# Patient Record
Sex: MALE | Race: ASIAN | URBAN - METROPOLITAN AREA
[De-identification: names, ages, dates, MRNs, and addresses within clinical notes are randomized per-mention and may not be internally consistent; named-entity substitution may affect disease eponyms.]

---

## 2018-09-21 ENCOUNTER — RADIANT APPOINTMENT (OUTPATIENT)
Dept: GENERAL RADIOLOGY | Facility: CLINIC | Age: 43
End: 2018-09-21
Attending: FAMILY MEDICINE
Payer: COMMERCIAL

## 2018-09-21 ENCOUNTER — OFFICE VISIT (OUTPATIENT)
Dept: URGENT CARE | Facility: URGENT CARE | Age: 43
End: 2018-09-21
Payer: COMMERCIAL

## 2018-09-21 ENCOUNTER — TELEPHONE (OUTPATIENT)
Dept: URGENT CARE | Facility: URGENT CARE | Age: 43
End: 2018-09-21

## 2018-09-21 VITALS
DIASTOLIC BLOOD PRESSURE: 66 MMHG | WEIGHT: 151.6 LBS | OXYGEN SATURATION: 99 % | HEART RATE: 69 BPM | SYSTOLIC BLOOD PRESSURE: 104 MMHG | TEMPERATURE: 98.3 F

## 2018-09-21 DIAGNOSIS — M25.511 ACUTE PAIN OF RIGHT SHOULDER: ICD-10-CM

## 2018-09-21 DIAGNOSIS — R10.13 DYSPEPSIA: ICD-10-CM

## 2018-09-21 DIAGNOSIS — R07.89 CHEST DISCOMFORT: ICD-10-CM

## 2018-09-21 DIAGNOSIS — A04.8 H. PYLORI INFECTION: Primary | ICD-10-CM

## 2018-09-21 DIAGNOSIS — M75.51 SUBACROMIAL BURSITIS OF RIGHT SHOULDER JOINT: Primary | ICD-10-CM

## 2018-09-21 LAB
ALBUMIN SERPL-MCNC: 3.6 G/DL (ref 3.4–5)
ALP SERPL-CCNC: 49 U/L (ref 40–150)
ALT SERPL W P-5'-P-CCNC: 20 U/L (ref 0–70)
AMYLASE SERPL-CCNC: 98 U/L (ref 30–110)
ANION GAP SERPL CALCULATED.3IONS-SCNC: 1 MMOL/L (ref 3–14)
AST SERPL W P-5'-P-CCNC: 22 U/L (ref 0–45)
BILIRUB SERPL-MCNC: 1.2 MG/DL (ref 0.2–1.3)
BUN SERPL-MCNC: 7 MG/DL (ref 7–30)
CALCIUM SERPL-MCNC: 9.5 MG/DL (ref 8.5–10.1)
CHLORIDE SERPL-SCNC: 109 MMOL/L (ref 94–109)
CO2 SERPL-SCNC: 30 MMOL/L (ref 20–32)
CREAT SERPL-MCNC: 0.8 MG/DL (ref 0.66–1.25)
ERYTHROCYTE [DISTWIDTH] IN BLOOD BY AUTOMATED COUNT: 12.3 % (ref 10–15)
GFR SERPL CREATININE-BSD FRML MDRD: >90 ML/MIN/1.7M2
GLUCOSE SERPL-MCNC: 104 MG/DL (ref 70–99)
HCT VFR BLD AUTO: 44.5 % (ref 40–53)
HGB BLD-MCNC: 14.8 G/DL (ref 13.3–17.7)
LIPASE SERPL-CCNC: 181 U/L (ref 73–393)
MCH RBC QN AUTO: 28.9 PG (ref 26.5–33)
MCHC RBC AUTO-ENTMCNC: 33.3 G/DL (ref 31.5–36.5)
MCV RBC AUTO: 87 FL (ref 78–100)
PLATELET # BLD AUTO: 255 10E9/L (ref 150–450)
POTASSIUM SERPL-SCNC: 4.8 MMOL/L (ref 3.4–5.3)
PROT SERPL-MCNC: 7.2 G/DL (ref 6.8–8.8)
RBC # BLD AUTO: 5.12 10E12/L (ref 4.4–5.9)
SODIUM SERPL-SCNC: 140 MMOL/L (ref 133–144)
WBC # BLD AUTO: 6.9 10E9/L (ref 4–11)

## 2018-09-21 PROCEDURE — 80053 COMPREHEN METABOLIC PANEL: CPT | Performed by: FAMILY MEDICINE

## 2018-09-21 PROCEDURE — 93000 ELECTROCARDIOGRAM COMPLETE: CPT | Performed by: FAMILY MEDICINE

## 2018-09-21 PROCEDURE — 99204 OFFICE O/P NEW MOD 45 MIN: CPT | Performed by: FAMILY MEDICINE

## 2018-09-21 PROCEDURE — 87338 HPYLORI STOOL AG IA: CPT | Performed by: FAMILY MEDICINE

## 2018-09-21 PROCEDURE — 36415 COLL VENOUS BLD VENIPUNCTURE: CPT | Performed by: FAMILY MEDICINE

## 2018-09-21 PROCEDURE — 82150 ASSAY OF AMYLASE: CPT | Performed by: FAMILY MEDICINE

## 2018-09-21 PROCEDURE — 85027 COMPLETE CBC AUTOMATED: CPT | Performed by: FAMILY MEDICINE

## 2018-09-21 PROCEDURE — 83690 ASSAY OF LIPASE: CPT | Performed by: FAMILY MEDICINE

## 2018-09-21 PROCEDURE — 73030 X-RAY EXAM OF SHOULDER: CPT | Mod: RT

## 2018-09-21 NOTE — MR AVS SNAPSHOT
After Visit Summary   9/21/2018    Adama Bearden    MRN: [id number]           Patient Information     Date Of Birth          1975        Visit Information        Provider Department      9/21/2018 10:30 AM Brock Mo MD [facility]        Today's Diagnoses     Subacromial bursitis of right shoulder joint    -  1    Acute pain of right shoulder        Dyspepsia        Chest discomfort          Care Instructions      Follow up with a primary care provider for further evaluation of the abdominal and chest discomfort and to establish care for your diabetes mellitus and for hypothyroidism.     Increase the dose of the Zantac (Ranitidine) to 150 mg by mouth twice a day.      Understanding Shoulder Impingement Syndrome    Shoulder impingement syndrome is a problem with the shoulder joint. It occurs when certain parts within the joint swell and are pinched. This can cause nagging pain and problems with moving the arm.  What causes shoulder impingement syndrome?  It is possible to develop impingement after years of normal shoulder use. But in most cases the condition occurs because of repeated overhead movements. These include such things as stocking shelves, painting, swimming, and throwing. These movements can irritate parts of the shoulder, leading to swelling. Swollen parts of the shoulder take up more room, making the joint space smaller. Some parts that may be involved include:    A sac of fluid (bursa) that cushions the shoulder joint.  When the bursa is irritated, it may lead to a condition called bursitis. This is when the bursa swells with fluid, filling and squeezing the joint space.    Fibrous tissues (tendons) that connect muscle to bone. When tendons are irritated, they may become swollen. This is called tendonitis.    The end (acromion) of the shoulder blade. This bone may be flat or hooked. If the acromion is hooked, the joint space may be smaller than normal.  Growths (bone spurs) on the acromion can also narrow the joint space. Acromion problems don t often cause impingement. But they can make it worse.  Symptoms of shoulder impingement syndrome  Symptoms include pain, pinching, or stiffness in your shoulder. Pain often comes with movement, particularly reaching overhead or backward. It may also be felt when the shoulder is at rest. Pain at night during sleep is common.  Treatment for shoulder impingement syndrome  Treatment will depend on the cause of the problem, how bad the problem is, and if other parts of the shoulder are damaged. Treatment may include the following:    Active rest. This allows the shoulder to heal. It means using the arm and shoulder, but avoiding activities that cause pain. These likely include reaching overhead or sleeping on your shoulder.    Cold packs. These help reduce swelling and relieve pain.    Prescription or over-the-counter pain medicines. These help relieve pain and swelling.    Arm and shoulder exercises. These help keep your shoulder joint mobile as it heals. They also help improve muscle strength around the joint.    Shots of medicine into the joint. This can help reduce swelling and pain for a short time.  If other measures don t work to relieve symptoms, you may need surgery. This opens up space in the joint to allow pain-free motion.  Possible complications of shoulder impingement syndrome  It might be tempting to stop using your shoulder completely to avoid pain. But doing so may lead to a condition called frozen shoulder. To help prevent this, follow instructions you are given for active rest and for doing exercises to help your shoulder heal.  When to call your healthcare provider  Call your healthcare provider right away if you have any of these:    Fever of 100.4 F (38 C) or higher, or as directed    Symptoms that don t get better, or get worse    New symptoms   Date Last Reviewed: 3/10/2016    3636-8845 [employer]. [address] [place] [address], [place] [address]. All rights reserved. This information is not intended as a substitute for professional medical care. Always follow your healthcare professional's instructions.        Bursitis  You have bursitis. This is an inflammation of the bursa. These are small, fluid-filled sacs that surround the larger joints of the body. The bursa help the muscles and tendons move smoothly over the joints.  Bursitis often happens in the shoulder. But it can also affect the elbows, hips, pelvis, knees, toes, and heels. Bursitis can be caused by injury, overuse of the joint, or infection of the bursa. Symptoms include pain and tenderness over a joint. Symptoms get worse with movement.  Bursitis is treated with an anti-inflammatory medicine and by resting the joint. More severe cases require injection of medicine directly into the bursa.    Home care    Rest the painful joint and protect it from movement. This will allow the inflammation to heal faster.    Apply an ice pack over the injured area for no more than 15 to 20 minutes. Do this every 3 to 6 hours for the first 24 to 48 hours. Keep using ice packs 3 to 4 times a day until the pain and swelling improves.     To make an ice pack, put ice cubes in a sealed plastic zip-lock bag. Wrap the bag in a clean, thin towel or cloth. Never put ice or an ice pack directly on the skin. As the ice melts, be careful to avoid getting any wrap or splint wet.    You may take over-the-counter pain medicine to treat pain and inflammation, unless another medicine was prescribed. Anti-inflammatory pain medicines may be more effective. Talk with your provider beforeusing these medicines if you have chronic liver or kidney disease, or ever had a stomach ulcer or GI (gastrointestinal) bleeding.    As your symptoms improve, slowly begin to move the joint. Do not overuse the joint. This may cause the symptoms to flare up again.  When to seek medical  advice  Call your healthcare provider right away if any of these occur:    Redness over the painful area    Increasing pain or swelling at the joint    Fever of 100.4 F (38 C) or above lasting for 24 to 48 hours  Date Last Reviewed: 11/21/2015 2000-2017 [employer]. [address] [place] [address]. All rights reserved. This information is not intended as a substitute for professional medical care. Always follow your healthcare professional's instructions.        Shoulder Exercises: Biceps Curl    This exercise stretches and strengthens your shoulders and arms. Before starting, read through all the instructions. During the exercise, breathe normally and use smooth movements. Stop if you feel any pain. If pain persists, call your healthcare provider.  Here are the steps for the biceps curl:     Hold a ____ pound weight in each hand, with your palms facing your body. Tuck your arms close to your sides. Ask your physical therapist how much weight to use.     Bend your left elbow and raise the weight to your left shoulder. As you lower that weight, bend your right elbow and raise the weight to your right shoulder. Continue to alternate arms.    Repeat ____ times. Do ____ sets ____ times a day.     CAUTION: Keep your arms close to your body throughout the exercise. Keep your wrists straight.   Date Last Reviewed: 11/1/2017 2000-2017 [employer]. [address] [place] [address]. All rights reserved. This information is not intended as a substitute for professional medical care. Always follow your healthcare professional's instructions.        Shoulder Exercises: Shoulder Press    This exercise stretches and strengthens your shoulders. Before starting, read through all the instructions. During the exercise, breathe normally and use smooth movements. Stop if you feel any pain. If pain persists, call your healthcare provider.    Hold a ____ pound weight in each hand,  elbows at shoulder level, palms facing forward. Arms to the side and slightly forward. Ask your physical therapist how much weight to use.     Raise one arm up until it s almost straight. Hold for a second. Lower the weight, extending the other arm up.    Repeat ____ times with each arm. Do ____ sets ____ times a day.  CAUTION: If you have shoulder problems, consult your healthcare provider before doing this exercise. Keep your head and body still during the exercise. Only your arms should move.   Date Last Reviewed: 11/1/2017 2000-2017 [employer]. [address] [place] [address]. All rights reserved. This information is not intended as a substitute for professional medical care. Always follow your healthcare professional's instructions.        Shoulder Exercises: Side Raise    This exercise stretches and strengthens your shoulders. Before starting, read through all the instructions. During the exercise, breathe normally and use smooth movements. Stop if you feel any pain. If pain persists, call your healthcare provider.    Stand straight, holding a ____ pound weight in each hand, arms at sides, feet shoulder-width apart. Ask your physical therapist or healthcare provider how much weight to use.     Slowly extend your arms up and out until weights are at shoulder level. Slowly return to starting position.    Repeat ____ times. Do ____ sets ____ times a day.     CAUTION: Don t swing the weights or raise weights above shoulder level.   Date Last Reviewed: 11/1/2017 2000-2017 [employer]. [address] [place] [address]. All rights reserved. This information is not intended as a substitute for professional medical care. Always follow your healthcare professional's instructions.        Shoulder Exercises: Triceps Press    This exercise stretches and strengthens your shoulders. Before starting, read through all the instructions. During the exercise, breathe normally  and use smooth movements. Stop if you feel any pain. If pain persists, call your healthcare provider.    Grasp a ___ pound  weight in each hand. Raise one arm overhead. Hold that arm close to your ear. Bend your elbow and lower the weight behind your head, as far as you can, being careful not to hit your head. Ask your physical therapist or healthcare provider how much weight to use.     Slowly straighten your elbow, extending your arm upward. Return to starting position.    Repeat ____ times with each arm. Do ____ sets ____ times a day.  CAUTION: Keep your head still and neck straight. Keep your arm close to your ear. Don t arch your back.   Date Last Reviewed: 11/1/2017 2000-2017 [employer]. [address] [place] [address]. All rights reserved. This information is not intended as a substitute for professional medical care. Always follow your healthcare professional's instructions.        Shoulder Exercises    To start, sit in a chair with your feet flat on the floor. Your weight should be slightly forward so that you re balanced evenly on your buttocks. Relax your shoulders and keep your head level. Avoid arching your back or rounding your shoulders. Using a chair with arms may help you keep your balance.    Raise your arms, elbows bent, to shoulder height.    Slowly move your forearms together. Hold for 5 seconds.    Return to starting position. Repeat 5 times.  Date Last Reviewed: 10/1/2015    9990-0702 [employer]. [address] [place] [address]. All rights reserved. This information is not intended as a substitute for professional medical care. Always follow your healthcare professional's instructions.                Follow-ups after your visit        Additional Services     ORTHOPEDICS ADULT REFERRAL       Your provider has referred you to: PREFERRED PROVIDERS:  [facility]  "Clinic  [phone]   [email or web address]  OTHER PROVIDERS:  [facility] [phone]   [email or web address]    Please be aware that coverage of these services is subject to the terms and limitations of your health insurance plan.  Call member services at your health plan with any benefit or coverage questions.      Please bring the following to your appointment:    >>   Any x-rays, CTs or MRIs which have been performed.  Contact the facility where they were done to arrange for  prior to your scheduled appointment.    >>   List of current medications   >>   This referral request   >>   Any documents/labs given to you for this referral                  Future tests that were ordered for you today     Open Future Orders        Priority Expected Expires Ordered    H Pylori antigen, stool Routine  10/21/2018 9/21/2018            Who to contact     If you have questions or need follow up information about today's clinic visit or your schedule please contact [facility] directly at [phone].  Normal or non-critical lab and imaging results will be communicated to you by MyChart, letter or phone within 4 business days after the clinic has received the results. If you do not hear from us within 7 days, please contact the clinic through [employer] or phone. If you have a critical or abnormal lab result, we will notify you by phone as soon as possible.  Submit refill requests through [employer] or call your pharmacy and they will forward the refill request to us. Please allow 3 business days for your refill to be completed.          Additional Information About Your Visit        [employer] Information     [employer] lets you send messages to your doctor, view your test results, renew your prescriptions, schedule appointments and more. To sign up, go to [email or web address] . Click on \"Log in\" on the left side of " "the screen, which will take you to the Welcome page. Then click on \"Sign up Now\" on the right side of the page.     You will be asked to enter the access code listed below, as well as some personal information. Please follow the directions to create your username and password.     Your access code is: [email or web address]  Expires: 2018 12:38 PM     Your access code will  in 90 days. If you need help or a new code, please call your [facility] clinic or [phone].        Care EveryWhere ID     This is your Care EveryWhere ID. This could be used by other organizations to access your [facility] medical records  [id number]        Your Vitals Were     Pulse Temperature Pulse Oximetry             69 98.3  F (36.8  C) (Tympanic) 99%          Blood Pressure from Last 3 Encounters:   18 104/66    Weight from Last 3 Encounters:   18 151 lb 9.6 oz (68.8 kg)              We Performed the Following     Amylase     CBC with platelets     Comprehensive metabolic panel     EKG 12-lead complete w/read - Clinics     [facility]     ORTHOPEDICS ADULT REFERRAL        Primary Care Provider Fax #    Physician No Ref-Primary [phone]       No address on file        Equal Access to Services     JAMIL GORE : Hadii alice Bright, waaxda francis, qaybta kaalmada ademarta, elicia lloyd. So [facility] [phone].    ATENCIÓN: Si habla español, tiene a keys disposición servicios gratuitos de asistencia lingüística. Jarrell al [phone].    We comply with applicable federal civil rights laws and [place] laws. We do not discriminate on the basis of race, color, national origin, age, disability, sex, sexual orientation, or gender identity.            Thank you!     Thank you for choosing [facility]  for your care. Our goal is always to provide you with excellent care. Hearing back from our patients is one way we can continue to improve our services. Please take a few minutes to " complete the written survey that you may receive in the mail after your visit with us. Thank you!             Your Updated Medication List - Protect others around you: Learn how to safely use, store and throw away your medicines at www.disposemymeds.org.          This list is accurate as of 9/21/18 12:38 PM.  Always use your most recent med list.                   Brand Name Dispense Instructions for use Diagnosis    LEVOTHYROXINE SODIUM PO      Take 75 mcg by mouth daily        METFORMIN HCL PO      Take 250 mg by mouth daily

## 2018-09-21 NOTE — PATIENT INSTRUCTIONS
Follow up with a primary care provider for further evaluation of the abdominal and chest discomfort and to establish care for your diabetes mellitus and for hypothyroidism.     Increase the dose of the Zantac (Ranitidine) to 150 mg by mouth twice a day.      follow up with an orthopedic specialist regarding the right shoulder    Place ice onto the right shoulder.     Understanding Shoulder Impingement Syndrome    Shoulder impingement syndrome is a problem with the shoulder joint. It occurs when certain parts within the joint swell and are pinched. This can cause nagging pain and problems with moving the arm.  What causes shoulder impingement syndrome?  It is possible to develop impingement after years of normal shoulder use. But in most cases the condition occurs because of repeated overhead movements. These include such things as stocking shelves, painting, swimming, and throwing. These movements can irritate parts of the shoulder, leading to swelling. Swollen parts of the shoulder take up more room, making the joint space smaller. Some parts that may be involved include:    A sac of fluid (bursa) that cushions the shoulder joint.  When the bursa is irritated, it may lead to a condition called bursitis. This is when the bursa swells with fluid, filling and squeezing the joint space.    Fibrous tissues (tendons) that connect muscle to bone. When tendons are irritated, they may become swollen. This is called tendonitis.    The end (acromion) of the shoulder blade. This bone may be flat or hooked. If the acromion is hooked, the joint space may be smaller than normal. Growths (bone spurs) on the acromion can also narrow the joint space. Acromion problems don t often cause impingement. But they can make it worse.  Symptoms of shoulder impingement syndrome  Symptoms include pain, pinching, or stiffness in your shoulder. Pain often comes with movement, particularly reaching overhead or backward. It may also be felt when  the shoulder is at rest. Pain at night during sleep is common.  Treatment for shoulder impingement syndrome  Treatment will depend on the cause of the problem, how bad the problem is, and if other parts of the shoulder are damaged. Treatment may include the following:    Active rest. This allows the shoulder to heal. It means using the arm and shoulder, but avoiding activities that cause pain. These likely include reaching overhead or sleeping on your shoulder.    Cold packs. These help reduce swelling and relieve pain.    Prescription or over-the-counter pain medicines. These help relieve pain and swelling.    Arm and shoulder exercises. These help keep your shoulder joint mobile as it heals. They also help improve muscle strength around the joint.    Shots of medicine into the joint. This can help reduce swelling and pain for a short time.  If other measures don t work to relieve symptoms, you may need surgery. This opens up space in the joint to allow pain-free motion.  Possible complications of shoulder impingement syndrome  It might be tempting to stop using your shoulder completely to avoid pain. But doing so may lead to a condition called frozen shoulder. To help prevent this, follow instructions you are given for active rest and for doing exercises to help your shoulder heal.  When to call your healthcare provider  Call your healthcare provider right away if you have any of these:    Fever of 100.4 F (38 C) or higher, or as directed    Symptoms that don t get better, or get worse    New symptoms   Date Last Reviewed: 3/10/2016    9770-8576 [employer]. [address] [place] [address], [place] [address]. All rights reserved. This information is not intended as a substitute for professional medical care. Always follow your healthcare professional's instructions.        Bursitis  You have bursitis. This is an inflammation of the bursa. These are small, fluid-filled sacs that surround the larger joints of  the body. The bursa help the muscles and tendons move smoothly over the joints.  Bursitis often happens in the shoulder. But it can also affect the elbows, hips, pelvis, knees, toes, and heels. Bursitis can be caused by injury, overuse of the joint, or infection of the bursa. Symptoms include pain and tenderness over a joint. Symptoms get worse with movement.  Bursitis is treated with an anti-inflammatory medicine and by resting the joint. More severe cases require injection of medicine directly into the bursa.    Home care    Rest the painful joint and protect it from movement. This will allow the inflammation to heal faster.    Apply an ice pack over the injured area for no more than 15 to 20 minutes. Do this every 3 to 6 hours for the first 24 to 48 hours. Keep using ice packs 3 to 4 times a day until the pain and swelling improves.     To make an ice pack, put ice cubes in a sealed plastic zip-lock bag. Wrap the bag in a clean, thin towel or cloth. Never put ice or an ice pack directly on the skin. As the ice melts, be careful to avoid getting any wrap or splint wet.    You may take over-the-counter pain medicine to treat pain and inflammation, unless another medicine was prescribed. Anti-inflammatory pain medicines may be more effective. Talk with your provider beforeusing these medicines if you have chronic liver or kidney disease, or ever had a stomach ulcer or GI (gastrointestinal) bleeding.    As your symptoms improve, slowly begin to move the joint. Do not overuse the joint. This may cause the symptoms to flare up again.  When to seek medical advice  Call your healthcare provider right away if any of these occur:    Redness over the painful area    Increasing pain or swelling at the joint    Fever of 100.4 F (38 C) or above lasting for 24 to 48 hours  Date Last Reviewed: 11/21/2015 2000-2017 [employer]. [address] [place] [address], [place] [address]. All rights reserved. This information is  not intended as a substitute for professional medical care. Always follow your healthcare professional's instructions.        Shoulder Exercises: Biceps Curl    This exercise stretches and strengthens your shoulders and arms. Before starting, read through all the instructions. During the exercise, breathe normally and use smooth movements. Stop if you feel any pain. If pain persists, call your healthcare provider.  Here are the steps for the biceps curl:     Hold a ____ pound weight in each hand, with your palms facing your body. Tuck your arms close to your sides. Ask your physical therapist how much weight to use.     Bend your left elbow and raise the weight to your left shoulder. As you lower that weight, bend your right elbow and raise the weight to your right shoulder. Continue to alternate arms.    Repeat ____ times. Do ____ sets ____ times a day.     CAUTION: Keep your arms close to your body throughout the exercise. Keep your wrists straight.   Date Last Reviewed: 11/1/2017 2000-2017 [employer]. [address] [place] [address]. All rights reserved. This information is not intended as a substitute for professional medical care. Always follow your healthcare professional's instructions.        Shoulder Exercises: Shoulder Press    This exercise stretches and strengthens your shoulders. Before starting, read through all the instructions. During the exercise, breathe normally and use smooth movements. Stop if you feel any pain. If pain persists, call your healthcare provider.    Hold a ____ pound weight in each hand, elbows at shoulder level, palms facing forward. Arms to the side and slightly forward. Ask your physical therapist how much weight to use.     Raise one arm up until it s almost straight. Hold for a second. Lower the weight, extending the other arm up.    Repeat ____ times with each arm. Do ____ sets ____ times a day.  CAUTION: If you have shoulder problems, consult your  healthcare provider before doing this exercise. Keep your head and body still during the exercise. Only your arms should move.   Date Last Reviewed: 11/1/2017 2000-2017 [employer]. [address] [place] [address]. All rights reserved. This information is not intended as a substitute for professional medical care. Always follow your healthcare professional's instructions.        Shoulder Exercises: Side Raise    This exercise stretches and strengthens your shoulders. Before starting, read through all the instructions. During the exercise, breathe normally and use smooth movements. Stop if you feel any pain. If pain persists, call your healthcare provider.    Stand straight, holding a ____ pound weight in each hand, arms at sides, feet shoulder-width apart. Ask your physical therapist or healthcare provider how much weight to use.     Slowly extend your arms up and out until weights are at shoulder level. Slowly return to starting position.    Repeat ____ times. Do ____ sets ____ times a day.     CAUTION: Don t swing the weights or raise weights above shoulder level.   Date Last Reviewed: 11/1/2017 2000-2017 [employer]. [address] [place] [address]. All rights reserved. This information is not intended as a substitute for professional medical care. Always follow your healthcare professional's instructions.        Shoulder Exercises: Triceps Press    This exercise stretches and strengthens your shoulders. Before starting, read through all the instructions. During the exercise, breathe normally and use smooth movements. Stop if you feel any pain. If pain persists, call your healthcare provider.    Grasp a ___ pound  weight in each hand. Raise one arm overhead. Hold that arm close to your ear. Bend your elbow and lower the weight behind your head, as far as you can, being careful not to hit your head. Ask your physical therapist or healthcare provider how much  weight to use.     Slowly straighten your elbow, extending your arm upward. Return to starting position.    Repeat ____ times with each arm. Do ____ sets ____ times a day.  CAUTION: Keep your head still and neck straight. Keep your arm close to your ear. Don t arch your back.   Date Last Reviewed: 11/1/2017 2000-2017 [employer]. [address] [place] [address]. All rights reserved. This information is not intended as a substitute for professional medical care. Always follow your healthcare professional's instructions.        Shoulder Exercises    To start, sit in a chair with your feet flat on the floor. Your weight should be slightly forward so that you re balanced evenly on your buttocks. Relax your shoulders and keep your head level. Avoid arching your back or rounding your shoulders. Using a chair with arms may help you keep your balance.    Raise your arms, elbows bent, to shoulder height.    Slowly move your forearms together. Hold for 5 seconds.    Return to starting position. Repeat 5 times.  Date Last Reviewed: 10/1/2015    6002-8105 [employer]. [address] [place] [address]. All rights reserved. This information is not intended as a substitute for professional medical care. Always follow your healthcare professional's instructions.

## 2018-09-21 NOTE — PROGRESS NOTES
"SUBJECTIVE:  Chief Complaint   Patient presents with     Shoulder Pain     right side x 2 days radiating pain down to wrist, also past history of neck pain right side     Heartburn     3 days ago, Zantac this morning, not much help per pt      Adama Bearden is a 43 year old right-handed male smoker with Diabetes Mellitus Type 2 who presents with two complaints:    Complaint #1: right right shoulder pain (at the tip of the shoulder and at the deltoid area) with radiation down the triceps.  However, the fingers of the right hand feel weak since this morning.    Symptoms began two days ago and worse since this morning.    Context:  Injury: No.  Pain exacerbated by abduction of the right shoulder.   Relieved by none. .  He treated it initially with pain relief balm without much relief.  . This is the first time this type of injury has occurred to this patient.     Patient had right-sided posterior neck pain one week ago.  No obvious injury.  The pain went away 3-4 days ago.     Complaint #2:  \"Heartburn\"for the past four days.  The discomfort (gassy sensation at the entire abdomen and at the bilateral chest) occurs about 1/2 to 1 hour after eating.  This discomfort lasts about one hour  .  Zantac this morning has not helped relieve the symptoms. No current left shoulder problems.    No sweats.  No shortness of breath.   Patient is concerned that this discomfort is due to his heart.    Patient denies frequent Ibuprofen/Aspirin use.    Patient drinks occasionally.    No blood in the stools.  Normal bowel movements once or twice daily.  No unexplained weight loss.  No pain with swallowing.  No troubles swallowing solids and liquids.  No family history of Gastrointestinal cancer.      Past medical history:    Diabetes Mellitus Type 2  Hypothyroidism  Smoker patient has been smoking for 5 years (one cigarette per day)      Current Outpatient Prescriptions   Medication Sig Dispense Refill     UNABLE TO FIND 250 mg " daily MEDICATION NAME: Glyciphage       UNABLE TO FIND Take 75 mg by mouth daily MEDICATION NAME: Thyronorm       Social History   Substance Use Topics     Smoking status: Current Some Day Smoker     Smokeless tobacco: Never Used     Alcohol use Not on file     Patient has lived in the [place] for the past year.     Family History:    Mother has Diabetes Mellitus (Insulin-dependent)  Mother  of Leukemia      ROS:  CONSTITUTIONAL: negative for weight loss/fevers/chills.  .    INTEGUMENTARY/SKIN: NEGATIVE for worrisome rashes, moles or lesions  EYES: NEGATIVE for vision changes or irritation  ENT/MOUTH: NEGATIVE for ear, mouth and throat problems  RESP:NEGATIVE for significant cough or SOB  CV: POSITIVE for bilateral chest discomfort.    GI: POSITIVE for gassy sensation in the entire abdomen.    : negative for dysuria, hematuria, decreased urinary stream,   MUSCULOSKELETAL: POSITIVE  for right shoulder pain.    NEURO: POSITIVE for a sensation of weakness in the right hand.   HEME/ALLERGY/IMMUNE: NEGATIVE for bleeding problems  PSYCHIATRIC: NEGATIVE for changes in mood or affect    EXAM:   /66 (BP Location: Right arm, Patient Position: Sitting, Cuff Size: Adult Regular)  Pulse 69  Temp 98.3  F (36.8  C) (Tympanic)  Wt 151 lb 9.6 oz (68.8 kg)  SpO2 99%  M/S Exam:Right shoulder has no edema/ecchymosis/hematomas.  No masses.  There is pain with palpation over the left trapezius muscle and over the subacromial space and over the deltoid region of the right shoulder.  There is full ROM at the right shoulder; however, there is pain with abduction.  Impingement signs are positive.    GENERAL APPEARANCE: healthy, alert and no distress  EYES:  No scleral icterus.   HEART:  regular rate and rhythm. Normal S1 S2 without murmurs/rubs/gallops  LUNGS:  clear to auscultation in all lung fields.    BACK:  No costovertebral angle pain with percussion.   EXTREMITIES: peripheral pulses normal  SKIN: no suspicious  lesions or rashes  NEURO: Normal strength and tone along the entire right arm and right hand and at the fingers of the right hand.  , sensory exam grossly normal at the fingers of the right hand.  , mentation intact and speech normal  ABDOMEN:  Soft.  Normal bowel sounds.  There is pain with palpation over the epigastrium.  No masses.  No rebound/guarding.  No hepatosplenomegaly.       LABS:    Results for orders placed or performed in visit on 09/21/18   CBC with platelets   Result Value Ref Range    WBC 6.9 4.0 - 11.0 10e9/L    RBC Count 5.12 4.4 - 5.9 10e12/L    Hemoglobin 14.8 13.3 - 17.7 g/dL    Hematocrit 44.5 40.0 - 53.0 %    MCV 87 78 - 100 fl    MCH 28.9 26.5 - 33.0 pg    MCHC 33.3 31.5 - 36.5 g/dL    RDW 12.3 10.0 - 15.0 %    Platelet Count 255 150 - 450 10e9/L   Comprehensive metabolic panel   Result Value Ref Range    Sodium 140 133 - 144 mmol/L    Potassium 4.8 3.4 - 5.3 mmol/L    Chloride 109 94 - 109 mmol/L    Carbon Dioxide 30 20 - 32 mmol/L    Anion Gap 1 (L) 3 - 14 mmol/L    Glucose 104 (H) 70 - 99 mg/dL    Urea Nitrogen 7 7 - 30 mg/dL    Creatinine 0.80 0.66 - 1.25 mg/dL    GFR Estimate >90 >60 mL/min/1.7m2    GFR Estimate If Black >90 >60 mL/min/1.7m2    Calcium 9.5 8.5 - 10.1 mg/dL    Bilirubin Total 1.2 0.2 - 1.3 mg/dL    Albumin 3.6 3.4 - 5.0 g/dL    Protein Total 7.2 6.8 - 8.8 g/dL    Alkaline Phosphatase 49 40 - 150 U/L    ALT 20 0 - 70 U/L    AST 22 0 - 45 U/L         X-RAY was done.    X-rays of the right shoulder are within normal limits     EKG:  Normal sinus rate and rhythm.  Rate is 60 beats per minute.  Normal axis.  Normal intervals.  No bundle branch blocks.  No Q waves.  No ST-T abnormalities.     ASSESSMENT:  Acute Right shoulder pain  Acute Subacromial Bursitis of the right shoulder     Dyspepsia    Chest discomfort. The worsening of the chest discomfort and the normal EKG makes cardiac causes less likely    PLAN:  For the Right shoulder pain and Right Subacromial  Bursitis:  Ice  Shoulder exercises  follow up with orthopedic specialist.     For the Dyspepsia:  Increase the dose of Zantac to 150 mg PO BID.  follow up with a primary care provider for further evaluation.  Pending labs:  H pylori stool antigen.  Amylase, Lipase. Patient will be contacted if these lab results are abnormal.     Brock Mo MD

## 2018-09-22 NOTE — TELEPHONE ENCOUNTER
SUBJECTIVE:  The patient's September 21, 2018, Lipase and Amylase tests were normal.      PLAN:  Please notify patient of these normal results.  According to these lab results, pancreatitis is not the cause of the patient's symptoms.      The H Pylori test is still pending.     Brock Mo MD

## 2018-09-24 LAB
H PYLORI AG STL QL IA: ABNORMAL
SPECIMEN SOURCE: ABNORMAL

## 2018-09-25 NOTE — TELEPHONE ENCOUNTER
Called patient, left message to return phone call. Please advised Dr. Fong and Dr. Larsen message below.  Praneeth Valderrama, Medical Assistant

## 2018-09-25 NOTE — TELEPHONE ENCOUNTER
H pylori test positive.    Prevpac can be called to the pharmacy of the patient's choice.  Orders entered in Epic.  Pt needs to follow up with primary care provider after finishing the Prevpac.

## 2018-09-26 NOTE — TELEPHONE ENCOUNTER
Left message for patient to call back to the clinic.   Charisma Chanel CMA (AAMA) 9/26/2018 9:46 AM

## 2018-09-26 NOTE — TELEPHONE ENCOUNTER
Patient returned phone call. Patient is aware of lab results and prevacid medication. Instructed patient to follow up with PCP after finishing rx.   Praneeth Valderrama Medical Assistant

## 2018-09-26 NOTE — TELEPHONE ENCOUNTER
Called -ve pharmacy, Prevpac will not be available until tomorrow.     Called patient, and left message that Prev pac will not be available until tomorrow. If he wants to change pharmacy to call back.     Praneeth Valderrama, Medical Assistant

## 2018-11-30 ENCOUNTER — OFFICE VISIT (OUTPATIENT)
Dept: URGENT CARE | Facility: URGENT CARE | Age: 43
End: 2018-11-30
Payer: COMMERCIAL

## 2018-11-30 VITALS
HEART RATE: 81 BPM | SYSTOLIC BLOOD PRESSURE: 109 MMHG | OXYGEN SATURATION: 100 % | TEMPERATURE: 98.8 F | DIASTOLIC BLOOD PRESSURE: 80 MMHG

## 2018-11-30 DIAGNOSIS — A04.8 H. PYLORI INFECTION: ICD-10-CM

## 2018-11-30 DIAGNOSIS — R10.9 STOMACH DISCOMFORT: Primary | ICD-10-CM

## 2018-11-30 DIAGNOSIS — R07.89 CHEST DISCOMFORT: ICD-10-CM

## 2018-11-30 PROCEDURE — 99214 OFFICE O/P EST MOD 30 MIN: CPT | Performed by: FAMILY MEDICINE

## 2018-11-30 PROCEDURE — 87338 HPYLORI STOOL AG IA: CPT | Performed by: FAMILY MEDICINE

## 2018-11-30 PROCEDURE — 93000 ELECTROCARDIOGRAM COMPLETE: CPT | Performed by: FAMILY MEDICINE

## 2018-11-30 NOTE — PATIENT INSTRUCTIONS
We will call you with the results of the stool sample    Take zantac/ranitidine 150mg twice a day (before breakfast and before dinner)  For increased stomach discomfort try tums/calcium carbonate      Call if symptoms are worsening or very bothersome

## 2018-11-30 NOTE — MR AVS SNAPSHOT
After Visit Summary   11/30/2018    Adama Bearden    MRN: [id number]           Patient Information     Date Of Birth          1975        Visit Information        Provider Department      11/30/2018 1:35 PM Kulwinder Morales MD [facility]        Today's Diagnoses     Stomach discomfort    -  1    Treated for H. pylori infection        Chest discomfort          Care Instructions    We will call you with the results of the stool sample    Take zantac/ranitidine 150mg twice a day (before breakfast and before dinner)  For increased stomach discomfort try tums/calcium carbonate      Call if symptoms are worsening or very bothersome          Follow-ups after your visit        Your next 10 appointments already scheduled     Dec 03, 2018  9:00 AM CST   PHYSICAL with Nadeem Nye [facility]-C   [facility]    [address] [place] [id number] [phone]              Future tests that were ordered for you today     Open Future Orders        Priority Expected Expires Ordered    H Pylori antigen, stool Routine  12/30/2018 11/30/2018            Who to contact     If you have questions or need follow up information about today's clinic visit or your schedule please contact [facility] directly at [phone].  Normal or non-critical lab and imaging results will be communicated to you by MyChart, letter or phone within 4 business days after the clinic has received the results. If you do not hear from us within 7 days, please contact the clinic through MyChart or phone. If you have a critical or abnormal lab result, we will notify you by phone as soon as possible.  Submit refill requests through [employer] or call your pharmacy and they will forward the refill request to us. Please allow 3 business days for your refill to be completed.          Additional Information About Your Visit        [facility]  "Information     [employer] lets you send messages to your doctor, view your test results, renew your prescriptions, schedule appointments and more. To sign up, go to [email or web address] . Click on \"Log in\" on the left side of the screen, which will take you to the Welcome page. Then click on \"Sign up Now\" on the right side of the page.     You will be asked to enter the access code listed below, as well as some personal information. Please follow the directions to create your username and password.     Your access code is: [email or web address]  Expires: 2018 11:38 AM     Your access code will  in 90 days. If you need help or a new code, please call your [facility] clinic or [phone].        Care EveryWhere ID     This is your Care EveryWhere ID. This could be used by other organizations to access your [facility] medical records  [id number]        Your Vitals Were     Pulse Temperature Pulse Oximetry             81 98.8  F (37.1  C) (Oral) 100%          Blood Pressure from Last 3 Encounters:   18 109/80   18 104/66    Weight from Last 3 Encounters:   18 151 lb 9.6 oz (68.8 kg)              We Performed the Following     EKG 12-lead complete w/read - Clinics        Primary Care Provider Fax #    Physician No Ref-Primary [phone]       No address on file        Equal Access to Services     CINTIA GORE : Hadii aad ku hadasho Soomaali, waaxda luqadaha, qaybta kaalmada adeegyada, elicia renee . So [facility] [phone].    ATENCIÓN: Si habla español, tiene a keys disposición servicios gratuitos de asistencia lingüística. Llame al [phone].    We comply with applicable federal civil rights laws and [place] laws. We do not discriminate on the basis of race, color, national origin, age, disability, sex, sexual orientation, or gender identity.            Thank you!     Thank you for choosing [facility]  for your care. Our goal is always to provide you " with excellent care. Hearing back from our patients is one way we can continue to improve our services. Please take a few minutes to complete the written survey that you may receive in the mail after your visit with us. Thank you!             Your Updated Medication List - Protect others around you: Learn how to safely use, store and throw away your medicines at www.disposemymeds.org.          This list is accurate as of 11/30/18  2:23 PM.  Always use your most recent med list.                   Brand Name Dispense Instructions for use Diagnosis    [id number] miscellaneous box    PREVPAC    1 each    Follow package instructions    H. pylori infection       LEVOTHYROXINE SODIUM PO      Take 75 mcg by mouth daily        METFORMIN HCL PO      Take 250 mg by mouth daily

## 2018-11-30 NOTE — PROGRESS NOTES
Subjective:   Adama Bearden is a 43 year old male who presents for   Chief Complaint   Patient presents with     Urgent Care     Chest Pain     Cx tightness, discomfort and Lt hand numbness started yesterday. Dry Cough x1 week. Had been experiencing lightheadedness this morning. Abdominal bloating and feels like throat is burning on/off x1 week, had Hx of stomach acid. Denies Cx pain only discomfort     H pylori positive earlier this year on 9/21/18 and was treated. Has not been screened for eradication.   Felt like symptoms had improved for a period but symptoms may been bothersome again. Denies pain in left shoulder also has chronic muscle/ joint discomfort in the shoulder joint. Slight lightheadedness.      3 days ago started taking 75mg twice a day not helping a lot.     Hx of reflux - reports abdominal bloating for a week.     FH: no individuals with hyperlipidemia.     There are no active problems to display for this patient.      Current Outpatient Prescriptions   Medication     LEVOTHYROXINE SODIUM PO     METFORMIN HCL PO     [id number] MISC     No current facility-administered medications for this visit.      ROS:  As above per HPI    Objective:   /80 (BP Location: Right arm, Patient Position: Chair, Cuff Size: Adult Regular)  Pulse 81  Temp 98.8  F (37.1  C) (Oral)  SpO2 100%, There is no height or weight on file to calculate BMI.  Gen:  NAD, well-nourished, sitting in chair comfortably  HEENT: EOMI, sclera anicteric, Head normocephalic, ; nares patent; moist mucous membranes  Neck: trachea midline, no thyromegaly  CV:  Hemodynamically stable, RRR  Pulm:  no increased work of breathing , CTAB, no wheezes/rales/rhonchi   ABD: soft, non-distended, + BS in all quadrants  Extrem: no cyanosis, edema or clubbing  Skin: no obvious rashes or abnormalities  Psych: Euthymic, linear thoughts, normal rate of speech    EKG: regular with rate 66 , QTc 364, RSR in v1 and v2, no  consecutive T wave inversions, no q waves in II/III/AVF    Assessment & Plan:   Adama Bearden, 43 year old male who presents with:    Stomach discomfort  Recently Treated for H. pylori infection  Will screen stool today to see if medication was effective to eradicate H. Pylori suspect with ongoing symptoms this may not be the case. Patient showed improvement with GI cocktail as listed below. REcommended for the time being to double the dose of zantac to a total of 150mg bid and take tums as needed. Patient anxious and wanted EKG done today so this was honored, no obvious abnormalities seen today as has no left sided arm pain or substernal chest discomfort. Stable vital signs.   - H Pylori antigen, stool    15mL of mylanta and 15mL of Maalox given here for his stomach discomfort/bloating.     Kulwinder Morales MD   [facility]     Options for treatment and/or follow-up care were reviewed with the patient. Adama Bearden and/or legal guardian was engaged and actively involved in the decision making process. Patient/guardian verbalized understanding of the options discussed and was satisfied with the final plan.

## 2018-12-03 LAB
H PYLORI AG STL QL IA: NORMAL
SPECIMEN SOURCE: NORMAL

## 2018-12-07 ENCOUNTER — OFFICE VISIT (OUTPATIENT)
Dept: PEDIATRICS | Facility: CLINIC | Age: 43
End: 2018-12-07
Payer: COMMERCIAL

## 2018-12-07 VITALS
HEART RATE: 90 BPM | DIASTOLIC BLOOD PRESSURE: 58 MMHG | WEIGHT: 152 LBS | TEMPERATURE: 98 F | HEIGHT: 66 IN | BODY MASS INDEX: 24.43 KG/M2 | OXYGEN SATURATION: 98 % | SYSTOLIC BLOOD PRESSURE: 92 MMHG

## 2018-12-07 DIAGNOSIS — K21.9 GASTROESOPHAGEAL REFLUX DISEASE WITHOUT ESOPHAGITIS: ICD-10-CM

## 2018-12-07 DIAGNOSIS — E03.8 OTHER SPECIFIED HYPOTHYROIDISM: ICD-10-CM

## 2018-12-07 DIAGNOSIS — Z23 NEED FOR PROPHYLACTIC VACCINATION AND INOCULATION AGAINST INFLUENZA: ICD-10-CM

## 2018-12-07 DIAGNOSIS — Z11.4 SCREENING FOR HUMAN IMMUNODEFICIENCY VIRUS: ICD-10-CM

## 2018-12-07 DIAGNOSIS — Z86.19 HISTORY OF HELICOBACTER PYLORI INFECTION: ICD-10-CM

## 2018-12-07 DIAGNOSIS — E11.9 TYPE 2 DIABETES MELLITUS WITHOUT COMPLICATION, WITHOUT LONG-TERM CURRENT USE OF INSULIN (H): ICD-10-CM

## 2018-12-07 DIAGNOSIS — Z13.220 SCREENING CHOLESTEROL LEVEL: ICD-10-CM

## 2018-12-07 DIAGNOSIS — E78.5 HYPERLIPIDEMIA LDL GOAL <70: ICD-10-CM

## 2018-12-07 DIAGNOSIS — Z00.00 ROUTINE GENERAL MEDICAL EXAMINATION AT A HEALTH CARE FACILITY: Primary | ICD-10-CM

## 2018-12-07 PROCEDURE — 90686 IIV4 VACC NO PRSV 0.5 ML IM: CPT | Performed by: INTERNAL MEDICINE

## 2018-12-07 PROCEDURE — 99396 PREV VISIT EST AGE 40-64: CPT | Mod: 25 | Performed by: INTERNAL MEDICINE

## 2018-12-07 PROCEDURE — 90471 IMMUNIZATION ADMIN: CPT | Performed by: INTERNAL MEDICINE

## 2018-12-07 PROCEDURE — 99213 OFFICE O/P EST LOW 20 MIN: CPT | Mod: 25 | Performed by: INTERNAL MEDICINE

## 2018-12-07 PROCEDURE — 99207 C FOOT EXAM  NO CHARGE: CPT | Mod: 25 | Performed by: INTERNAL MEDICINE

## 2018-12-07 ASSESSMENT — ENCOUNTER SYMPTOMS
HEMATURIA: 0
NAUSEA: 0
ABDOMINAL PAIN: 0
EYE PAIN: 0
DIARRHEA: 0
COUGH: 0
CONSTIPATION: 0
DYSURIA: 0
DIZZINESS: 1
FEVER: 0
PARESTHESIAS: 0
HEADACHES: 0
SHORTNESS OF BREATH: 0
HEARTBURN: 1
ARTHRALGIAS: 1
WEAKNESS: 0
NERVOUS/ANXIOUS: 0
HEMATOCHEZIA: 0
CHILLS: 0
FREQUENCY: 0
JOINT SWELLING: 0
PALPITATIONS: 0
MYALGIAS: 1

## 2018-12-07 NOTE — PROGRESS NOTES

## 2018-12-07 NOTE — MR AVS SNAPSHOT
After Visit Summary   12/7/2018    Adama Bearden    MRN: [id number]           Patient Information     Date Of Birth          1975        Visit Information        Provider Department      12/7/2018 9:10 AM Paramjit Lazaro MD [facility]        Today's Diagnoses     Routine general medical examination at a health care facility    -  1    Type 2 diabetes mellitus without complication, without long-term current use of insulin (H)        Hypothyroidism        Gastroesophageal reflux disease without esophagitis        History of Helicobacter pylori infection        Screening cholesterol level        Screening for human immunodeficiency virus          Care Instructions    Nice to meet you today!    Labs next week - come fasting (nothing to eat/drink besides water for 8 hours). I'll send you the results on [employer].     Keep taking your diabetes medication and thyroid medication - I'll let you know if the dose needs to change.     Okay to try zantac 150 mg every day for the next month. Can go up to twice a day if needed. We'll touch base in 1 month to see how things are going. Avoiding spicy food, alcohol, coffee - watch to see if certain foods make it worse.     I'll see you back in a month for diabetes and to check on the reflux. You can always come back sooner if the joints start to bother you again.     I do recommend seeing a dentist.    Preventive Health Recommendations  Male Ages 40 to 49    Yearly exam:             See your health care provider every year in order to  o   Review health changes.   o   Discuss preventive care.    o   Review your medicines if your doctor has prescribed any.    You should be tested each year for STDs (sexually transmitted diseases) if you re at risk.     Have a cholesterol test every 5 years.     Have a colonoscopy (test for colon cancer) if someone in your family has had colon cancer or polyps before age 50.     After age 45, have a  diabetes test (fasting glucose). If you are at risk for diabetes, you should have this test every 3 years.      Talk with your health care provider about whether or not a prostate cancer screening test (PSA) is right for you.    Shots: Get a flu shot each year. Get a tetanus shot every 10 years.     Nutrition:    Eat at least 5 servings of fruits and vegetables daily.     Eat whole-grain bread, whole-wheat pasta and brown rice instead of white grains and rice.     Get adequate Calcium and Vitamin D.     Lifestyle    Exercise for at least 150 minutes a week (30 minutes a day, 5 days a week). This will help you control your weight and prevent disease.     Limit alcohol to one drink per day.     No smoking.     Wear sunscreen to prevent skin cancer.     See your dentist every six months for an exam and cleaning.              Follow-ups after your visit        Follow-up notes from your care team     Return in about 4 weeks (around 1/4/2019) for Follow Up.      Your next 10 appointments already scheduled     Dec 13, 2018  7:50 AM CST   LAB with [facility]   [facility]    [address]  [address]  [place] [facility] [address] [phone]           Please do not eat 10-12 hours before your appointment if you are coming in fasting for labs on lipids, cholesterol, or glucose (sugar). This does not apply to pregnant women. Water, hot tea and black coffee (with nothing added) are okay. Do not drink other fluids, diet soda or chew gum.            Jan 04, 2019  8:50 AM CST   Office Visit with Paramjit Lazaro MD   [facility] ([facility])    [address]  [address]  [place] [facility] [address] [phone]           Bring a current list of meds and any records pertaining to this visit. For Physicals, please bring immunization records and any forms needing to be filled out. Please arrive 10 minutes early to complete paperwork.               Future tests that were ordered for you today     Open Future Orders        Priority Expected Expires Ordered    Lipid panel reflex to direct LDL Fasting Routine  12/7/2019 12/7/2018    Albumin Random Urine Quantitative with Creat Ratio Routine  12/7/2019 12/7/2018    HIV Antigen Antibody Combo Routine  12/7/2019 12/7/2018    Comprehensive metabolic panel Routine  12/7/2019 12/7/2018    Hemoglobin A1c Routine  12/7/2019 12/7/2018    TSH with free T4 reflex Routine  12/7/2019 12/7/2018            Who to contact     If you have questions or need follow up information about today's clinic visit or your schedule please contact [facility] directly at [phone].  Normal or non-critical lab and imaging results will be communicated to you by [employer], letter or phone within 4 business days after the clinic has received the results. If you do not hear from us within 7 days, please contact the clinic through [facility] or phone. If you have a critical or abnormal lab result, we will notify you by phone as soon as possible.  Submit refill requests through [employer] or call your pharmacy and they will forward the refill request to us. Please allow 3 business days for your refill to be completed.          Additional Information About Your Visit        [employer] Information     [employer] gives you secure access to your electronic health record. If you see a primary care provider, you can also send messages to your care team and make appointments. If you have questions, please call your primary care clinic.  If you do not have a primary care provider, please call [phone] and they will assist you.        Care EveryWhere ID     This is your Care EveryWhere ID. This could be used by other organizations to access your [facility] medical records  [id number]        Your Vitals Were     Pulse Temperature Pulse Oximetry             90 98  F (36.7  C) (Oral) 98%          Blood Pressure from Last 3 Encounters:   12/07/18 92/58    11/30/18 109/80   09/21/18 104/66    Weight from Last 3 Encounters:   12/07/18 152 lb (68.9 kg)   09/21/18 151 lb 9.6 oz (68.8 kg)              We Performed the Following     FOOT EXAM        Primary Care Provider Fax #    Physician No Ref-Primary [phone]       No address on file        Equal Access to Services     [facility]: Hadii aad ku hadasho Soomaali, waaxda luqadaha, qaybta kaalmada adeegyada, elicia haywoodin hayaan adeeg shravanjimmydebi renee . So [facility] [phone].    ATENCIÓN: Si habla español, tiene a keys disposición servicios gratuitos de asistencia lingüística. Llame al [phone].    We comply with applicable federal civil rights laws and [place] laws. We do not discriminate on the basis of race, color, national origin, age, disability, sex, sexual orientation, or gender identity.            Thank you!     Thank you for choosing [facility]  for your care. Our goal is always to provide you with excellent care. Hearing back from our patients is one way we can continue to improve our services. Please take a few minutes to complete the written survey that you may receive in the mail after your visit with us. Thank you!             Your Updated Medication List - Protect others around you: Learn how to safely use, store and throw away your medicines at www.disposemymeds.org.          This list is accurate as of 12/7/18 10:09 AM.  Always use your most recent med list.                   Brand Name Dispense Instructions for use Diagnosis    LEVOTHYROXINE SODIUM PO      Take 75 mcg by mouth daily        METFORMIN HCL PO      Take by mouth daily

## 2018-12-07 NOTE — PROGRESS NOTES
SUBJECTIVE:   CC: Adama Bearden is an 43 year old male who presents for preventative health visit.     Physical   Annual:     Getting at least 3 servings of Calcium per day:  Yes    Bi-annual eye exam:  NO    Dental care twice a year:  NO    Sleep apnea or symptoms of sleep apnea:  None    Diet:  Diabetic    Frequency of exercise:  1 day/week    Duration of exercise:  Less than 15 minutes    Taking medications regularly:  Yes    Medication side effects:  None    Additional concerns today:  No    PHQ-2 Total Score: 0    CONCERNS: Heartburn-past positive H. Pylori, Pain in Right shoulder and Left Elbow      # history of h pylori  - was treated, recently test was negative  - taking zantac when he feels bad... Took it 4 days continuously and did feel better  - taking tums, after lunch and dinner  - reduced eating spicy food   - wants to know if he can take zantac regularly    # DM  - diagnosed 6-7 years go  - takes metformin (glyciphage) from [facility], 250 mg daily  - last blood tests 1-1.5 years ago  - no pain/tingling in feet.  - has not seen an eye doctor    # Hypothyroidism  - diagnosed 3-4 years ao  - thyronorm 25 mcg    # Joint pain  - happened a few days ago, now better  - okay with not going into detail about this today, can rtc if needed    Today's PHQ-2 Score:   PHQ-2 ( 1999 Pfizer) 12/7/2018   Q1: Little interest or pleasure in doing things 0   Q2: Feeling down, depressed or hopeless 0   PHQ-2 Score 0   Q1: Little interest or pleasure in doing things Not at all   Q2: Feeling down, depressed or hopeless Not at all   PHQ-2 Score 0     Abuse: Current or Past(Physical, Sexual or Emotional)- No  Do you feel safe in your environment? Yes    Social History   Substance Use Topics     Smoking status: Current Some Day Smoker     Smokeless tobacco: Never Used     Alcohol use Yes      Comment: occ     Alcohol Use 12/7/2018   If you drink alcohol do you typically have greater than 3 drinks per day OR greater than 7  drinks per week? No     Last PSA: No results found for: PSA    Reviewed orders with patient. Reviewed health maintenance and updated orders accordingly - Yes  Patient Active Problem List   Diagnosis     History of Helicobacter pylori infection     Type 2 diabetes mellitus without complication, without long-term current use of insulin (H)     Hypothyroidism     Gastroesophageal reflux disease without esophagitis     Past Surgical History:   Procedure Laterality Date     NO HISTORY OF SURGERY         Social History   Substance Use Topics     Smoking status: Current Some Day Smoker     Smokeless tobacco: Never Used     Alcohol use Yes      Comment: occ     Family History   Problem Relation Age of Onset     Diabetes Mother      Cancer Mother      Leukemia,  at age 55     Cerebrovascular Disease Father      Colon Cancer No family hx of      Prostate Cancer No family hx of          Current Outpatient Prescriptions   Medication Sig Dispense Refill     LEVOTHYROXINE SODIUM PO Take 75 mcg by mouth daily       METFORMIN HCL PO Take by mouth daily        No Known Allergies    Reviewed and updated as needed this visit by clinical staff  Tobacco  Meds  Med Hx  Surg Hx  Fam Hx  Soc Hx        Reviewed and updated as needed this visit by Provider        Past Medical History:   Diagnosis Date     Hypothyroidism 2018     Type 2 diabetes mellitus without complication, without long-term current use of insulin (H) 2018      Past Surgical History:   Procedure Laterality Date     NO HISTORY OF SURGERY         Review of Systems   Constitutional: Negative for chills and fever.   HENT: Negative for congestion, ear pain and hearing loss.    Eyes: Negative for pain and visual disturbance.   Respiratory: Negative for cough and shortness of breath.    Cardiovascular: Negative for chest pain, palpitations and peripheral edema.   Gastrointestinal: Positive for heartburn. Negative for abdominal pain, constipation, diarrhea,  "hematochezia and nausea.   Genitourinary: Negative for discharge, dysuria, frequency, genital sores, hematuria, impotence and urgency.   Musculoskeletal: Positive for arthralgias and myalgias. Negative for joint swelling.   Skin: Negative for rash.   Neurological: Positive for dizziness. Negative for weakness, headaches and paresthesias.   Psychiatric/Behavioral: Negative for mood changes. The patient is not nervous/anxious.      OBJECTIVE:   BP 92/58  Pulse 90  Temp 98  F (36.7  C) (Oral)  Ht 5' 5.5\" (1.664 m)  Wt 152 lb (68.9 kg)  SpO2 98%  BMI 24.91 kg/m2    Physical Exam  GENERAL: healthy, alert and no distress  EYES: Eyes grossly normal to inspection, PERRL and conjunctivae and sclerae normal  HENT: ear canals and TM's normal, nose and mouth without ulcers or lesions  NECK: no adenopathy, no asymmetry, masses, or scars and thyroid normal to palpation  RESP: lungs clear to auscultation - no rales, rhonchi or wheezes  CV: regular rate and rhythm, normal S1 S2, no S3 or S4, no murmur, click or rub, no peripheral edema and peripheral pulses strong  ABDOMEN: soft, nontender, no hepatosplenomegaly, no masses and bowel sounds normal  MS: no gross musculoskeletal defects noted, no edema  SKIN: no suspicious lesions or rashes  NEURO: Normal strength and tone, mentation intact and speech normal  PSYCH: mentation appears normal, affect normal/bright  Diabetic foot exam: normal DP and PT pulses, no trophic changes or ulcerative lesions, normal sensory exam and normal monofilament exam    Diagnostic Test Results:  Future labs ordered    ASSESSMENT/PLAN:   1. Routine general medical examination at a health care facility  Establishing care today.     2. Type 2 diabetes mellitus without complication, without long-term current use of insulin (H)  Managed with metformin. Gets his medications from [facility].   - Comprehensive metabolic panel; Future  - Hemoglobin A1c; Future  - Albumin Random Urine Quantitative with Creat " Ratio; Future  - FOOT EXAM  - Will need Pneumovax vaccine  - Ace pending urine microalbumin - has low bp  - statin pending cholesterol, will likely need to start  - needs eye exam    3. Hypothyroidism  Gets his medications from [facility].   - TSH with free T4 reflex; Future    4. Gastroesophageal reflux disease without esophagitis  5. History of Helicobacter pylori infection  Per patient had a recent negative test.   Mild symptoms - has already made dietary changes. Will try daily zantac and f/u in 1 month.    6. Screening cholesterol level  Will return for fasting labs.  - Lipid panel reflex to direct LDL Fasting; Future    7. Screening for human immunodeficiency virus  - HIV Antigen Antibody Combo; Future    ---------  PATIENT INSTRUCTIONS    Nice to meet you today!    Labs next week - come fasting (nothing to eat/drink besides water for 8 hours). I'll send you the results on [employer].     Keep taking your diabetes medication and thyroid medication - I'll let you know if the dose needs to change.     Okay to try zantac 150 mg every day for the next month. Can go up to twice a day if needed. We'll touch base in 1 month to see how things are going. Avoiding spicy food, alcohol, coffee - watch to see if certain foods make it worse.     I'll see you back in a month for diabetes and to check on the reflux. You can always come back sooner if the joints start to bother you again.     I do recommend seeing a dentist.  --------------------    COUNSELING:   Reviewed preventive health counseling, as reflected in patient instructions       Regular exercise       Healthy diet/nutrition       Vision screening       Immunizations    Vaccinated for: Influenza     Will need Tdap and Pneumovax at next appointment       HIV screeninx in teen years, 1x in adult years, and at intervals if high risk    BP Readings from Last 1 Encounters:   18 92/58     Estimated body mass index is 24.91 kg/(m^2) as calculated from the following:    " Height as of this encounter: 5' 5.5\" (1.664 m).    Weight as of this encounter: 152 lb (68.9 kg).     reports that he has been smoking.  He has never used smokeless tobacco.    Counseling Resources:  ATP IV Guidelines  Pooled Cohorts Equation Calculator  FRAX Risk Assessment  ICSI Preventive Guidelines  Dietary Guidelines for Americans, 2010  USDA's MyPlate  ASA Prophylaxis  Lung CA Screening    Paramjit Lazaro MD  [facility]  "

## 2018-12-07 NOTE — PATIENT INSTRUCTIONS
Nice to meet you today!    Labs next week - come fasting (nothing to eat/drink besides water for 8 hours). I'll send you the results on MyChart.     Keep taking your diabetes medication and thyroid medication - I'll let you know if the dose needs to change.     Okay to try zantac 150 mg every day for the next month. Can go up to twice a day if needed. We'll touch base in 1 month to see how things are going. Avoiding spicy food, alcohol, coffee - watch to see if certain foods make it worse.     I'll see you back in a month for diabetes and to check on the reflux. You can always come back sooner if the joints start to bother you again.     I do recommend seeing a dentist.    Preventive Health Recommendations  Male Ages 40 to 49    Yearly exam:             See your health care provider every year in order to  o   Review health changes.   o   Discuss preventive care.    o   Review your medicines if your doctor has prescribed any.    You should be tested each year for STDs (sexually transmitted diseases) if you re at risk.     Have a cholesterol test every 5 years.     Have a colonoscopy (test for colon cancer) if someone in your family has had colon cancer or polyps before age 50.     After age 45, have a diabetes test (fasting glucose). If you are at risk for diabetes, you should have this test every 3 years.      Talk with your health care provider about whether or not a prostate cancer screening test (PSA) is right for you.    Shots: Get a flu shot each year. Get a tetanus shot every 10 years.     Nutrition:    Eat at least 5 servings of fruits and vegetables daily.     Eat whole-grain bread, whole-wheat pasta and brown rice instead of white grains and rice.     Get adequate Calcium and Vitamin D.     Lifestyle    Exercise for at least 150 minutes a week (30 minutes a day, 5 days a week). This will help you control your weight and prevent disease.     Limit alcohol to one drink per day.     No smoking.     Wear  sunscreen to prevent skin cancer.     See your dentist every six months for an exam and cleaning.

## 2018-12-13 DIAGNOSIS — Z13.220 SCREENING CHOLESTEROL LEVEL: ICD-10-CM

## 2018-12-13 DIAGNOSIS — Z11.4 SCREENING FOR HUMAN IMMUNODEFICIENCY VIRUS: ICD-10-CM

## 2018-12-13 DIAGNOSIS — E11.9 TYPE 2 DIABETES MELLITUS WITHOUT COMPLICATION, WITHOUT LONG-TERM CURRENT USE OF INSULIN (H): ICD-10-CM

## 2018-12-13 DIAGNOSIS — E03.8 OTHER SPECIFIED HYPOTHYROIDISM: ICD-10-CM

## 2018-12-13 LAB
CHOLEST SERPL-MCNC: 178 MG/DL
CREAT UR-MCNC: 56 MG/DL
HBA1C MFR BLD: 6.2 % (ref 0–5.6)
HDLC SERPL-MCNC: 34 MG/DL
HIV 1+2 AB+HIV1 P24 AG SERPL QL IA: NONREACTIVE
LDLC SERPL CALC-MCNC: 87 MG/DL
MICROALBUMIN UR-MCNC: <5 MG/L
MICROALBUMIN/CREAT UR: NORMAL MG/G CR (ref 0–17)
NONHDLC SERPL-MCNC: 144 MG/DL
T4 FREE SERPL-MCNC: 1.12 NG/DL (ref 0.76–1.46)
TRIGL SERPL-MCNC: 285 MG/DL
TSH SERPL DL<=0.005 MIU/L-ACNC: 4.92 MU/L (ref 0.4–4)

## 2018-12-13 PROCEDURE — 36415 COLL VENOUS BLD VENIPUNCTURE: CPT | Performed by: INTERNAL MEDICINE

## 2018-12-13 PROCEDURE — 87389 HIV-1 AG W/HIV-1&-2 AB AG IA: CPT | Performed by: INTERNAL MEDICINE

## 2018-12-13 PROCEDURE — 84439 ASSAY OF FREE THYROXINE: CPT | Performed by: INTERNAL MEDICINE

## 2018-12-13 PROCEDURE — 83036 HEMOGLOBIN GLYCOSYLATED A1C: CPT | Performed by: INTERNAL MEDICINE

## 2018-12-13 PROCEDURE — 80061 LIPID PANEL: CPT | Performed by: INTERNAL MEDICINE

## 2018-12-13 PROCEDURE — 82043 UR ALBUMIN QUANTITATIVE: CPT | Performed by: INTERNAL MEDICINE

## 2018-12-13 PROCEDURE — 84443 ASSAY THYROID STIM HORMONE: CPT | Performed by: INTERNAL MEDICINE

## 2018-12-14 PROBLEM — E78.5 HYPERLIPIDEMIA LDL GOAL <70: Status: ACTIVE | Noted: 2018-12-14

## 2019-01-04 ENCOUNTER — OFFICE VISIT (OUTPATIENT)
Dept: PEDIATRICS | Facility: CLINIC | Age: 44
End: 2019-01-04
Payer: COMMERCIAL

## 2019-01-04 VITALS
WEIGHT: 151.5 LBS | HEART RATE: 104 BPM | SYSTOLIC BLOOD PRESSURE: 96 MMHG | BODY MASS INDEX: 24.35 KG/M2 | OXYGEN SATURATION: 96 % | DIASTOLIC BLOOD PRESSURE: 64 MMHG | HEIGHT: 66 IN | TEMPERATURE: 98.5 F

## 2019-01-04 DIAGNOSIS — E11.9 TYPE 2 DIABETES MELLITUS WITHOUT COMPLICATION, WITHOUT LONG-TERM CURRENT USE OF INSULIN (H): Primary | ICD-10-CM

## 2019-01-04 DIAGNOSIS — G89.29 CHRONIC RIGHT SHOULDER PAIN: ICD-10-CM

## 2019-01-04 DIAGNOSIS — M25.511 CHRONIC RIGHT SHOULDER PAIN: ICD-10-CM

## 2019-01-04 DIAGNOSIS — K21.9 GASTROESOPHAGEAL REFLUX DISEASE WITHOUT ESOPHAGITIS: ICD-10-CM

## 2019-01-04 DIAGNOSIS — E03.8 OTHER SPECIFIED HYPOTHYROIDISM: ICD-10-CM

## 2019-01-04 DIAGNOSIS — E78.5 HYPERLIPIDEMIA LDL GOAL <70: ICD-10-CM

## 2019-01-04 PROCEDURE — 99214 OFFICE O/P EST MOD 30 MIN: CPT | Performed by: INTERNAL MEDICINE

## 2019-01-04 RX ORDER — METFORMIN HCL 500 MG
500 TABLET, EXTENDED RELEASE 24 HR ORAL
Qty: 90 TABLET | Refills: 1 | Status: SHIPPED | OUTPATIENT
Start: 2019-01-04

## 2019-01-04 RX ORDER — SIMVASTATIN 40 MG
40 TABLET ORAL AT BEDTIME
Qty: 90 TABLET | Refills: 3 | Status: SHIPPED | OUTPATIENT
Start: 2019-01-04 | End: 2019-12-26

## 2019-01-04 ASSESSMENT — MIFFLIN-ST. JEOR: SCORE: 1507.01

## 2019-01-04 NOTE — PATIENT INSTRUCTIONS
"It was nice to see you in clinic.    I'm glad the zantac is working for the reflux. You can keep taking this as needed.     For the diabetes  - I sent in a prescription for metformin in case you need it.   - schedule an eye exam - we do these every year. Ask them to send me a copy of their note.  - no protein in your urine - we will check this again next year  - starting a cholesterol medication to help lower your cholesterol and protect your heart - this is important for people with diabetes. Take this at night. Let me know if you are having any muscle cramps that feel like the flu - most people do just fine on this medication.     For the thyroid  - take the thyronorm 25 mcg once daily  - we'll recheck your TSH in 1 month. You'll need a \"lab only\" appointment to do this. I'll be in touch with your results and let you know if I want you to change your dose.     For the shoulder  - I think you may have a rotator cuff injury. The good news is that most people get better with physical therapy - they should call you to schedule this.   - If you don't notice any improvement after 3-4 physical therapy sessions please call me and I'll refer you to our sports medicine team.    I'll see you back in 3 months for a diabetes check. If you're doing well on the metformin and your A1c continues to look this good we can talk about spacing out our visits to every 6 months.     Next time we'll do your tetanus shot and the pneumovax shot (important for people with diabetes to help prevent pneumonia).   "

## 2019-01-04 NOTE — PROGRESS NOTES
SUBJECTIVE:   Adama Bearden is a 43 year old male who presents to clinic today for the following health issues:    Diabetes Follow-up    Patient is checking blood sugars: not at all    Diabetic concerns: None     Symptoms of hypoglycemia (low blood sugar): dizzy, blurred vision     Paresthesias (numbness or burning in feet) or sores: No     Date of last diabetic eye exam: n/a    BP Readings from Last 2 Encounters:   12/07/18 92/58   11/30/18 109/80     Hemoglobin A1C (%)   Date Value   12/13/2018 6.2 (H)     LDL Cholesterol Calculated (mg/dL)   Date Value   12/13/2018 87     Diabetes Management Resources    Hyperlipidemia Follow-Up    Rate your low fat/cholesterol diet?: not monitoring fat    Taking statin?  No    Other lipid medications/supplements?:  None    The 10-year ASCVD risk score (Boris ANGULO Jr., et al., 2013) is: 8.3%    Values used to calculate the score:      Age: 43 years      Sex: Male      Is Non- : No      Diabetic: Yes      Tobacco smoker: Yes      Systolic Blood Pressure: 96 mmHg      Is BP treated: No      HDL Cholesterol: 34 mg/dL      Total Cholesterol: 178 mg/dL    Hypothyroidism Follow-up    Since last visit, patient describes the following symptoms: Weight stable, no hair loss, no skin changes, no constipation, no loose stools    Amount of exercise or physical activity: None    Problems taking medications regularly: No    Medication side effects: none    Diet: regular (no restrictions)    On thyronorm 25 mcg.   Started taking it twice a day for one week. Has been back to taking once daily.     #GERD  Doing better, is taking zantac PRN.    # Right shoulder pain  Started in sept  Seen in [facility] -> xrays neg.   Painful over lateral aspect, no one point hurts  Does get better with ibuprofen  Painful when lifting arm over head.    Problem list and histories reviewed & adjusted, as indicated.  Additional history: as documented    Patient Active Problem List   Diagnosis      "History of Helicobacter pylori infection     Type 2 diabetes mellitus without complication, without long-term current use of insulin (H)     Hypothyroidism     Gastroesophageal reflux disease without esophagitis     Hyperlipidemia LDL goal <70     Past Surgical History:   Procedure Laterality Date     NO HISTORY OF SURGERY         Social History     Tobacco Use     Smoking status: Current Some Day Smoker     Smokeless tobacco: Never Used   Substance Use Topics     Alcohol use: Yes     Comment: occ     Family History   Problem Relation Age of Onset     Diabetes Mother      Cancer Mother         Leukemia,  at age 55     Cerebrovascular Disease Father      Colon Cancer No family hx of      Prostate Cancer No family hx of          Current Outpatient Medications   Medication Sig Dispense Refill     ACE/ARB/ARNI NOT PRESCRIBED (INTENTIONAL) Please choose reason not prescribed, below       LEVOTHYROXINE SODIUM PO Take 75 mcg by mouth daily       metFORMIN (GLUCOPHAGE-XR) 500 MG 24 hr tablet Take 1 tablet (500 mg) by mouth daily (with dinner) 90 tablet 1     METFORMIN HCL PO Take by mouth daily        ranitidine (ZANTAC) 75 MG tablet Take 150 mg by mouth 2 times daily       simvastatin (ZOCOR) 40 MG tablet Take 1 tablet (40 mg) by mouth At Bedtime 90 tablet 3     No Known Allergies    Reviewed and updated as needed this visit by clinical staff       Reviewed and updated as needed this visit by Provider         ROS:  Constitutional, HEENT, cardiovascular, pulmonary, gi and gu systems are negative, except as otherwise noted.    OBJECTIVE:     BP 96/64   Pulse 104   Temp 98.5  F (36.9  C) (Oral)   Ht 1.664 m (5' 5.5\")   Wt 68.7 kg (151 lb 8 oz)   SpO2 96%   BMI 24.83 kg/m    Body mass index is 24.83 kg/m .  GENERAL: healthy, alert and no distress  RESP: lungs clear to auscultation - no rales, rhonchi or wheezes  CV: regular rate and rhythm, normal S1 S2, no S3 or S4, no murmur, click or rub, no peripheral edema and " peripheral pulses strong  MS: right shoulder with no point tenderness, pain with raising arm above head, pain with coke can test but nearly equal strength  SKIN: no suspicious lesions or rashes    Diagnostic Test Results:  none     ASSESSMENT/PLAN:     1. Type 2 diabetes mellitus without complication, without long-term current use of insulin (H)  A1c controlled.   BP normal, no microabluminuria -> no ace indication  Starting statin.   Will need to talk about starting aspirin next visit.  Needs pneumovax next visit.  Due for eye exam.  - simvastatin (ZOCOR) 40 MG tablet; Take 1 tablet (40 mg) by mouth At Bedtime  Dispense: 90 tablet; Refill: 3  - metFORMIN (GLUCOPHAGE-XR) 500 MG 24 hr tablet; Take 1 tablet (500 mg) by mouth daily (with dinner)  Dispense: 90 tablet; Refill: 1    2. Hypothyroidism  Mild elevation in TSH last visit. Recheck in 1 month.  - TSH with free T4 reflex; Future    3. Hyperlipidemia LDL goal <70  - simvastatin (ZOCOR) 40 MG tablet; Take 1 tablet (40 mg) by mouth At Bedtime  Dispense: 90 tablet; Refill: 3    4. Chronic right shoulder pain  Suspect rotator cuff based on exam -> will start with physical therapy. See PI for plan.  - [facility], AND CHIROPRACTIC REFERRAL; Future    5. Gastroesophageal reflux disease without esophagitis  Okay to continue zantac prn.    Patient Instructions   It was nice to see you in clinic.    I'm glad the zantac is working for the reflux. You can keep taking this as needed.     For the diabetes  - I sent in a prescription for metformin in case you need it.   - schedule an eye exam - we do these every year. Ask them to send me a copy of their note.  - no protein in your urine - we will check this again next year  - starting a cholesterol medication to help lower your cholesterol and protect your heart - this is important for people with diabetes. Take this at night. Let me know if you are having any muscle cramps that feel like the flu - most people do just fine on  "this medication.     For the thyroid  - take the thyronorm 25 mcg once daily  - we'll recheck your TSH in 1 month. You'll need a \"lab only\" appointment to do this. I'll be in touch with your results and let you know if I want you to change your dose.     For the shoulder  - I think you may have a rotator cuff injury. The good news is that most people get better with physical therapy - they should call you to schedule this.   - If you don't notice any improvement after 3-4 physical therapy sessions please call me and I'll refer you to our sports medicine team.    I'll see you back in 3 months for a diabetes check. If you're doing well on the metformin and your A1c continues to look this good we can talk about spacing out our visits to every 6 months.     Next time we'll do your tetanus shot and the pneumovax shot (important for people with diabetes to help prevent pneumonia).     Paramjit Lazaro MD  [facility] [place]  "

## 2019-01-11 ENCOUNTER — THERAPY VISIT (OUTPATIENT)
Dept: PHYSICAL THERAPY | Facility: CLINIC | Age: 44
End: 2019-01-11
Payer: COMMERCIAL

## 2019-01-11 DIAGNOSIS — G89.29 CHRONIC RIGHT SHOULDER PAIN: ICD-10-CM

## 2019-01-11 DIAGNOSIS — M25.511 CHRONIC RIGHT SHOULDER PAIN: ICD-10-CM

## 2019-01-11 PROCEDURE — 97110 THERAPEUTIC EXERCISES: CPT | Mod: GP | Performed by: PHYSICAL THERAPIST

## 2019-01-11 PROCEDURE — 97161 PT EVAL LOW COMPLEX 20 MIN: CPT | Mod: GP | Performed by: PHYSICAL THERAPIST

## 2019-01-11 NOTE — PROGRESS NOTES
[facility] Initial Evaluation  Subjective:  The history is provided by the patient. No  was used.   Adama Bearden is a 43 year old male with a right shoulder condition.        Pt notes R shoulder pain, insidious onset, on or around 10/1/2018.  Pt went to [facility], xrays taken which were negative.  Pt took pain meds and it hasn't helped.  Pt struggles with reaching behind his back and up over head.  Pt denies any previous shoulder problems.  Pt works as a . Pt denies any neck problems as well.  .    Patient reports pain:  Lateral and in the joint.    Pain is described as sharp and is intermittent and reported as 8/10.  Associated symptoms:  Numbness (occasional numbness in all fingers and during the night). Pain is the same all the time.  Symptoms are exacerbated by lying on extremity, certain positions, lifting, using arm behind back, using arm at shoulder level and using arm overhead and relieved by rest.  Since onset symptoms are gradually worsening.  Special tests:  X-ray.      General health as reported by patient is fair.  Pertinent medical history includes:  Diabetes (thyroid).                                            Objective:  System                   Shoulder Evaluation:  ROM:  AROM:    Flexion:  Right:  115  Extension: Right: 40        External Rotation:  Right:  35            Extension/Internal Rotation:  Right:  L1          Strength:    Flexion: Right: 4/5      Pain:  +        Internal Rotation:  Right: 5-/5     Pain:  External Rotation:   Right:5-/5     Pain:                                                     General     ROS    Assessment/Plan:    Patient is a 43 year old male with right side shoulder complaints.    Patient has the following significant findings with corresponding treatment plan.                Diagnosis 1:  R shoulder pain, frozen shoulder      Pain -  hot/cold therapy, manual therapy, self management, education,  directional preference exercise and home program  Decreased ROM/flexibility - manual therapy and therapeutic exercise  Decreased strength - therapeutic exercise and therapeutic activities  Impaired muscle performance - neuro re-education  Decreased function - therapeutic activities    Therapy Evaluation Codes:   1) History comprised of:   Personal factors that impact the plan of care:      Profession.    Comorbidity factors that impact the plan of care are:      Diabetes.     Medications impacting care: None.  2) Examination of Body Systems comprised of:   Body structures and functions that impact the plan of care:      Shoulder.   Activity limitations that impact the plan of care are:      Cooking, Dressing, Grasping, Lifting and Throwing.  3) Clinical presentation characteristics are:   Evolving/Changing.  4) Decision-Making    Moderate complexity using standardized patient assessment instrument and/or measureable assessment of functional outcome.  Cumulative Therapy Evaluation is: Low complexity.    Previous and current functional limitations:  (See Goal Flow Sheet for this information)    Short term and Long term goals: (See Goal Flow Sheet for this information)     Communication ability:  Patient appears to be able to clearly communicate and understand verbal and written communication and follow directions correctly.  Treatment Explanation - The following has been discussed with the patient:   RX ordered/plan of care  Anticipated outcomes  Possible risks and side effects  This patient would benefit from PT intervention to resume normal activities.   Rehab potential is good.    Frequency:  1 X week, once daily  Duration:  for 8 weeks  Discharge Plan:  Achieve all LTG.  Independent in home treatment program.  Reach maximal therapeutic benefit.    Please refer to the daily flowsheet for treatment today, total treatment time and time spent performing 1:1 timed codes.

## 2019-03-12 PROBLEM — G89.29 CHRONIC RIGHT SHOULDER PAIN: Status: RESOLVED | Noted: 2019-01-11 | Resolved: 2019-03-12

## 2019-03-12 PROBLEM — M25.511 CHRONIC RIGHT SHOULDER PAIN: Status: RESOLVED | Noted: 2019-01-11 | Resolved: 2019-03-12

## 2019-12-24 DIAGNOSIS — E78.5 HYPERLIPIDEMIA LDL GOAL <70: ICD-10-CM

## 2019-12-24 DIAGNOSIS — E11.9 TYPE 2 DIABETES MELLITUS WITHOUT COMPLICATION, WITHOUT LONG-TERM CURRENT USE OF INSULIN (H): ICD-10-CM

## 2019-12-26 RX ORDER — SIMVASTATIN 40 MG
40 TABLET ORAL AT BEDTIME
Qty: 30 TABLET | Refills: 0 | Status: SHIPPED | OUTPATIENT
Start: 2019-12-26 | End: 2020-01-27

## 2019-12-26 NOTE — TELEPHONE ENCOUNTER
"Medication is being filled for 1 time refill only due to:  Patient needs to be seen because due for fasting physical.   Berna BOWSER RN    Last Written Prescription Date:  1/4/2019  Last Fill Quantity: 90,  # refills: 3   Last office visit: 1/4/2019 with prescribing provider:     Future Office Visit:     Requested Prescriptions   Pending Prescriptions Disp Refills     simvastatin (ZOCOR) 40 MG tablet 90 tablet 3     Sig: Take 1 tablet (40 mg) by mouth At Bedtime       Statins Protocol Failed - 12/24/2019  4:58 PM        Failed - LDL on file in past 12 months     Recent Labs   Lab Test 12/13/18  0755   LDL 87             Passed - No abnormal creatine kinase in past 12 months     No lab results found.             Passed - Recent (12 mo) or future (30 days) visit within the authorizing provider's specialty     Patient has had an office visit with the authorizing provider or a provider within the authorizing providers department within the previous 12 mos or has a future within next 30 days. See \"Patient Info\" tab in inbasket, or \"Choose Columns\" in Meds & Orders section of the refill encounter.              Passed - Medication is active on med list        Passed - Patient is age 18 or older          "

## 2020-01-10 ENCOUNTER — TELEPHONE (OUTPATIENT)
Dept: PEDIATRICS | Facility: CLINIC | Age: 45
End: 2020-01-10

## 2020-01-10 ENCOUNTER — MYC MEDICAL ADVICE (OUTPATIENT)
Dept: PEDIATRICS | Facility: CLINIC | Age: 45
End: 2020-01-10

## 2020-01-10 NOTE — LETTER
[facility]  [address]  [place] [address]  [phone]      January 20, 2020    Adama Bearden                                                                                                                                                       [address]    [place] [address]              Dear Adama,    It looks like you are due for your annual physical. Please call the number above if you would like to get one scheduled. I do urge you to call/schedule as soon as possible. Dr. Lazaro is out on maternity leave until the middle of March, so we can help schedule with a different provider if you would like to get in sooner. Thank you for choosing [facility].                  Sincerely,  Juanita De Luna MA on 1/20/2020 at 9:12 AM

## 2020-01-10 NOTE — LETTER
[facility]  [address]  [address]  [place] [address] [phone]  February 14, 2020    Adama Bearden  [address]    [place] [address]    Dear Adama,    I care about your health and have reviewed your health plan. I have reviewed your medical conditions, medication list, and lab results and am making recommendations based on this review, to better manage your health.    You are in particular need of attention regarding:  -Cholesterol  -Diabetes  -Wellness (Physical) Visit     I am recommending that you:  {recommendations:-schedule a WELLNESS (Physical) APPOINTMENT with me.   I will check fasting labs the same day - nothing to eat except water and meds for 8-10 hours prior.      Here is a list of Health Maintenance topics that are due now or due soon:  Health Maintenance Due   Topic Date Due     ANNUAL REVIEW OF HM ORDERS  1973     EYE EXAM  1973     DTAP/TDAP/TD IMMUNIZATION (1 - Tdap) 04/24/1984     PNEUMOCOCCAL IMMUNIZATION 19-64 MEDIUM RISK (1 of 1 - PPSV23) 04/24/1992     A1C  06/13/2019     INFLUENZA VACCINE (1) 09/01/2019     BMP  09/21/2019     PREVENTIVE CARE VISIT  12/07/2019     DIABETIC FOOT EXAM  12/07/2019     LIPID  12/13/2019     MICROALBUMIN  12/13/2019     TSH W/FREE T4 REFLEX  12/13/2019     PHQ-2  01/01/2020       Please call us at [phone] (or use [email or web address]) to address the above recommendations.     Thank you for trusting [facility] and we appreciate the opportunity to serve you.  We look forward to supporting your healthcare needs in the future.    Healthy Regards,      Juanita De Luna MA on 2/14/2020 at 2:11 PM

## 2020-01-27 DIAGNOSIS — E78.5 HYPERLIPIDEMIA LDL GOAL <70: ICD-10-CM

## 2020-01-27 DIAGNOSIS — E11.9 TYPE 2 DIABETES MELLITUS WITHOUT COMPLICATION, WITHOUT LONG-TERM CURRENT USE OF INSULIN (H): ICD-10-CM

## 2020-01-27 RX ORDER — SIMVASTATIN 40 MG
40 TABLET ORAL AT BEDTIME
Qty: 90 TABLET | Refills: 0 | Status: SHIPPED | OUTPATIENT
Start: 2020-01-27

## 2020-01-27 NOTE — TELEPHONE ENCOUNTER
Routing refill request to provider for review/approval because:  Maricruz given x1 and patient did not follow up, please advise  Rosy Ventura, RN  Message handled by Nurse Triage.

## 2020-01-27 NOTE — TELEPHONE ENCOUNTER
Refilled x 3 months.  Please help patient schedule with Dr Lazaro upon her return.    Katya Monsalve MD  Internal Medicine - Pediatrics

## 2020-01-28 NOTE — TELEPHONE ENCOUNTER
Spoke to pt directly. Pt has moved and will be establishing care with a new PCP in [place].    Allyson Mendoza,     on 1/28/2020 at 11:48 AM

## 2020-03-11 ENCOUNTER — HEALTH MAINTENANCE LETTER (OUTPATIENT)
Age: 45
End: 2020-03-11

## 2021-01-03 ENCOUNTER — HEALTH MAINTENANCE LETTER (OUTPATIENT)
Age: 46
End: 2021-01-03

## 2021-04-25 ENCOUNTER — HEALTH MAINTENANCE LETTER (OUTPATIENT)
Age: 46
End: 2021-04-25

## 2021-08-14 ENCOUNTER — HEALTH MAINTENANCE LETTER (OUTPATIENT)
Age: 46
End: 2021-08-14

## 2021-08-17 NOTE — TELEPHONE ENCOUNTER
Panel Management Review      Patient has the following on his problem list:     Diabetes    ASA: Not Required     Last A1C  Lab Results   Component Value Date    A1C 6.2 12/13/2018     A1C tested: Passed    Last LDL:    Lab Results   Component Value Date    CHOL 178 12/13/2018     Lab Results   Component Value Date    HDL 34 12/13/2018     Lab Results   Component Value Date    LDL 87 12/13/2018     Lab Results   Component Value Date    TRIG 285 12/13/2018     No results found for: CHOLHDLRATIO  Lab Results   Component Value Date    NHDL 144 12/13/2018       Is the patient on a Statin? YES             Is the patient on Aspirin? NO    Medications     HMG CoA Reductase Inhibitors     simvastatin (ZOCOR) 40 MG tablet             Last three blood pressure readings:  BP Readings from Last 3 Encounters:   01/04/19 96/64   12/07/18 92/58   11/30/18 109/80       Date of last diabetes office visit: 1/42019     Tobacco History:     History   Smoking Status     Current Some Day Smoker   Smokeless Tobacco     Never Used           Composite cancer screening  Chart review shows that this patient is due/due soon for the following None  Summary:    Patient is due/failing the following:   Eye exam, diabetic foot exam A1C and PHYSICAL    Action needed:   Patient needs office visit for annual . and Patient needs fasting lab only appointment    Type of outreach:    Sent [employer] message.    Questions for provider review:    None                                                                                                                                    Juanita De Luna MA on 1/10/2020 at 2:59 PM      Chart routed to Care Team .           Request sent from  pharmacy requesting refill of Fioricet. I called pharmacy and Lashawn Baker did verify that patient has used all available refills. Last seen 6/17/21, next appt 11/17/21. Med verified. Order pended.

## 2021-10-10 ENCOUNTER — HEALTH MAINTENANCE LETTER (OUTPATIENT)
Age: 46
End: 2021-10-10

## 2022-03-26 ENCOUNTER — HEALTH MAINTENANCE LETTER (OUTPATIENT)
Age: 47
End: 2022-03-26

## 2022-05-21 ENCOUNTER — HEALTH MAINTENANCE LETTER (OUTPATIENT)
Age: 47
End: 2022-05-21

## 2022-09-18 ENCOUNTER — HEALTH MAINTENANCE LETTER (OUTPATIENT)
Age: 47
End: 2022-09-18

## 2023-01-28 ENCOUNTER — HEALTH MAINTENANCE LETTER (OUTPATIENT)
Age: 48
End: 2023-01-28

## 2023-06-04 ENCOUNTER — HEALTH MAINTENANCE LETTER (OUTPATIENT)
Age: 48
End: 2023-06-04

## 2023-07-30 ENCOUNTER — HEALTH MAINTENANCE LETTER (OUTPATIENT)
Age: 48
End: 2023-07-30

## 2024-02-25 ENCOUNTER — HEALTH MAINTENANCE LETTER (OUTPATIENT)
Age: 49
End: 2024-02-25